# Patient Record
Sex: FEMALE | Race: WHITE | NOT HISPANIC OR LATINO | Employment: FULL TIME | URBAN - METROPOLITAN AREA
[De-identification: names, ages, dates, MRNs, and addresses within clinical notes are randomized per-mention and may not be internally consistent; named-entity substitution may affect disease eponyms.]

---

## 2024-03-06 NOTE — PROGRESS NOTES
Chief Complaint   Patient presents with    Gynecologic Exam       HPI:   23 y.o. . Presents for well woman exam. Contraception:  Condoms  Menses:   q 28 days, lasts 6 days, changes super tampon q 2-3 hrs on heaviest days.   Pain:  Moderate, not too bad  Last pap: none previous   Complaints: Pt has no complaints today.        History reviewed. No pertinent past medical history.   Past Surgical History:   Procedure Laterality Date    NO PAST SURGERIES        Family History   Problem Relation Age of Onset    Breast cancer Mother         30s, mother was genetic tested and not hereditary cancer    Ovarian cancer Neg Hx     Dysfunctional uterine bleeding Neg Hx     Uterine cancer Neg Hx     Colon cancer Neg Hx     Prostate cancer Neg Hx     Deep vein thrombosis Neg Hx     Pulmonary embolism Neg Hx      Allergies as of 2024    (No Known Allergies)        PCP: needs PCP    /74   Pulse 72   Wt 57.6 kg (127 lb)   LMP 2024 Comment: Normal flow, lasting about 6 days  Breastfeeding No     PHYSICAL EXAM: Chaperone present   General- NAD, alert and oriented, appropriate  Psych- Normal mood, good memory  Neck- No masses, no thyroid enlargement  Lymphatic- No palpable neck, axillary, or groin nodes  CV- Regular rhythm, no murmurs  Resp- CTA to bases, no wheezes  Abdomen- Soft, non distended, non tender, no masses  Breast left-  Bilaterally symmetrical, no masses, non tender, no nipple discharge  Breast right- Bilaterally symmetrical, no masses, non tender, no nipple discharge  External genitalia- Normal female, no lesions  Urethra/meatus- Normal, no masses, non tender, no prolapse  Bladder- Normal, no masses, non tender, no prolapse  Vagina- Normal, no atrophy, no lesions, no discharge, no prolapse  Cvx- Normal, no lesions, no discharge, No cervical motion tenderness  Uterus- Normal size, shape & consistency.  Non tender, mobile.  Adnexa- No mass, non tender  Anus/Rectum/Perineum- Not performed  Ext- No  edema, no cyanosis    Skin- No lesions, no rashes, no acanthosis nigricans       ASSESSMENT and PLAN:    Diagnoses and all orders for this visit:    1. Well woman exam (Primary)  -     IGP,rfx Aptima HPV All Pth      Preventative:   BREAST HEALTH- Monthly self breast exam importance and how to reviewed. MMG and/or MRI (prn) reviewed per society guidelines and her individual history. Screening Imaging: Not medically needed  CERVICAL CANCER Screening- Reviewed current ASCCP guidelines for screening w and wo cotest HPV, age specific.  Screen: Updated today  COLON CANCER Screening- Reviewed current medical society guidelines and options.  Screen:  Not medically needed  SEXUAL HEALTH: Declines STD screening,  Safe sex and condoms  BONE HEALTH- Reviewed current medical society guidelines and options for testing, calcium and vit D intake.  Weight bearing exercise.  DEXA: Not medically needed  VACCINATIONS Recommended: Gardisil/HPV vaccine (up to 46yo), to check vaccine records for previous hpv vaccination  Importance discussed, risk being unvaccinated reviewed.  Questions answered  Genetic testing: Does not qualify.  Smoking status- NON SMOKER  Follow up PCP/Specialist PMHx and Labs  TRACK MENSES, RTO if <q21d, >7d long, heavy or painful.    All BIRTH CONTROL options R/B/A/SE/E of each reviewed in detail.  SAFE SEX/condoms importance reviewed.    Consider Mammogram starting age 30, follow up sooner with any breast changes or concerns. Mother had breast cancer and hereditary cancer testing was negative    Follow Up:  Return in about 1 year (around 3/14/2025).        Olga Lidia Addison, APRN  03/14/2024

## 2024-03-14 ENCOUNTER — OFFICE VISIT (OUTPATIENT)
Dept: OBSTETRICS AND GYNECOLOGY | Facility: CLINIC | Age: 24
End: 2024-03-14
Payer: COMMERCIAL

## 2024-03-14 VITALS — SYSTOLIC BLOOD PRESSURE: 124 MMHG | DIASTOLIC BLOOD PRESSURE: 74 MMHG | WEIGHT: 127 LBS | HEART RATE: 72 BPM

## 2024-03-14 DIAGNOSIS — Z01.419 WELL WOMAN EXAM: Primary | ICD-10-CM

## 2024-03-14 PROCEDURE — G0123 SCREEN CERV/VAG THIN LAYER: HCPCS | Performed by: NURSE PRACTITIONER

## 2024-03-14 RX ORDER — CETIRIZINE HYDROCHLORIDE, PSEUDOEPHEDRINE HYDROCHLORIDE 5; 120 MG/1; MG/1
1 TABLET, FILM COATED, EXTENDED RELEASE ORAL AS NEEDED
COMMUNITY

## 2024-03-15 ENCOUNTER — PATIENT ROUNDING (BHMG ONLY) (OUTPATIENT)
Dept: OBSTETRICS AND GYNECOLOGY | Facility: CLINIC | Age: 24
End: 2024-03-15
Payer: COMMERCIAL

## 2024-03-15 NOTE — PROGRESS NOTES
A My-Chart message has been sent to the patient for [place] [facility]

## 2024-03-19 LAB
CONV .: NORMAL
CYTOLOGIST CVX/VAG CYTO: NORMAL
CYTOLOGY CVX/VAG DOC CYTO: NORMAL
CYTOLOGY CVX/VAG DOC THIN PREP: NORMAL
DX ICD CODE: NORMAL
Lab: NORMAL
OTHER STN SPEC: NORMAL
STAT OF ADQ CVX/VAG CYTO-IMP: NORMAL

## 2024-07-02 ENCOUNTER — TRANSCRIBE ORDERS (OUTPATIENT)
Dept: LAB | Facility: HOSPITAL | Age: 24
End: 2024-07-02
Payer: COMMERCIAL

## 2024-07-02 ENCOUNTER — LAB (OUTPATIENT)
Dept: LAB | Facility: HOSPITAL | Age: 24
End: 2024-07-02
Payer: COMMERCIAL

## 2024-07-02 DIAGNOSIS — L70.0 ACNE VULGARIS: Primary | ICD-10-CM

## 2024-07-02 DIAGNOSIS — L70.0 ACNE VULGARIS: ICD-10-CM

## 2024-07-02 LAB
ALBUMIN SERPL-MCNC: 4.5 G/DL (ref 3.5–5.2)
ALP SERPL-CCNC: 63 U/L (ref 39–117)
ALT SERPL W P-5'-P-CCNC: 9 U/L (ref 1–33)
AST SERPL-CCNC: 19 U/L (ref 1–32)
BILIRUB CONJ SERPL-MCNC: <0.2 MG/DL (ref 0–0.3)
BILIRUB INDIRECT SERPL-MCNC: NORMAL MG/DL
BILIRUB SERPL-MCNC: 0.3 MG/DL (ref 0–1.2)
CHOLEST SERPL-MCNC: 160 MG/DL (ref 0–200)
HDLC SERPL-MCNC: 71 MG/DL (ref 40–60)
LDLC SERPL CALC-MCNC: 73 MG/DL (ref 0–100)
LDLC/HDLC SERPL: 1.02 {RATIO}
PROT SERPL-MCNC: 7.4 G/DL (ref 6–8.5)
TRIGL SERPL-MCNC: 83 MG/DL (ref 0–150)
VLDLC SERPL-MCNC: 16 MG/DL (ref 5–40)

## 2024-07-02 PROCEDURE — 36415 COLL VENOUS BLD VENIPUNCTURE: CPT

## 2024-07-02 PROCEDURE — 80076 HEPATIC FUNCTION PANEL: CPT

## 2024-07-02 PROCEDURE — 80061 LIPID PANEL: CPT

## 2024-09-10 ENCOUNTER — TRANSCRIBE ORDERS (OUTPATIENT)
Dept: ADMINISTRATIVE | Facility: HOSPITAL | Age: 24
End: 2024-09-10
Payer: COMMERCIAL

## 2024-09-10 ENCOUNTER — LAB (OUTPATIENT)
Dept: LAB | Facility: HOSPITAL | Age: 24
End: 2024-09-10
Payer: COMMERCIAL

## 2024-09-10 ENCOUNTER — OFFICE VISIT (OUTPATIENT)
Dept: INTERNAL MEDICINE | Age: 24
End: 2024-09-10
Payer: COMMERCIAL

## 2024-09-10 VITALS
DIASTOLIC BLOOD PRESSURE: 62 MMHG | TEMPERATURE: 98.6 F | HEART RATE: 87 BPM | OXYGEN SATURATION: 98 % | HEIGHT: 67 IN | WEIGHT: 128 LBS | BODY MASS INDEX: 20.09 KG/M2 | RESPIRATION RATE: 18 BRPM | SYSTOLIC BLOOD PRESSURE: 118 MMHG

## 2024-09-10 DIAGNOSIS — L70.0 ACNE VULGARIS: ICD-10-CM

## 2024-09-10 DIAGNOSIS — J30.1 SEASONAL ALLERGIC RHINITIS DUE TO POLLEN: ICD-10-CM

## 2024-09-10 DIAGNOSIS — Z00.00 ROUTINE HISTORY AND PHYSICAL EXAMINATION OF ADULT: Primary | ICD-10-CM

## 2024-09-10 DIAGNOSIS — L70.0 ACNE VULGARIS: Primary | ICD-10-CM

## 2024-09-10 DIAGNOSIS — E55.9 VITAMIN D DEFICIENCY: ICD-10-CM

## 2024-09-10 DIAGNOSIS — Z00.00 LABORATORY EXAMINATION ORDERED AS PART OF A ROUTINE GENERAL MEDICAL EXAMINATION: ICD-10-CM

## 2024-09-10 DIAGNOSIS — Z11.59 NEED FOR HEPATITIS C SCREENING TEST: ICD-10-CM

## 2024-09-10 LAB
ALBUMIN SERPL-MCNC: 4.4 G/DL (ref 3.5–5.2)
ALP SERPL-CCNC: 69 U/L (ref 39–117)
ALT SERPL W P-5'-P-CCNC: 14 U/L (ref 1–33)
AST SERPL-CCNC: 19 U/L (ref 1–32)
BILIRUB CONJ SERPL-MCNC: <0.2 MG/DL (ref 0–0.3)
BILIRUB INDIRECT SERPL-MCNC: NORMAL MG/DL
BILIRUB SERPL-MCNC: 0.3 MG/DL (ref 0–1.2)
CHOLEST SERPL-MCNC: 180 MG/DL (ref 0–200)
HDLC SERPL-MCNC: 58 MG/DL (ref 40–60)
LDLC SERPL CALC-MCNC: 107 MG/DL (ref 0–100)
LDLC/HDLC SERPL: 1.82 {RATIO}
PROT SERPL-MCNC: 7.4 G/DL (ref 6–8.5)
TRIGL SERPL-MCNC: 83 MG/DL (ref 0–150)
VLDLC SERPL-MCNC: 15 MG/DL (ref 5–40)

## 2024-09-10 PROCEDURE — 80061 LIPID PANEL: CPT

## 2024-09-10 PROCEDURE — 80076 HEPATIC FUNCTION PANEL: CPT

## 2024-09-10 PROCEDURE — 36415 COLL VENOUS BLD VENIPUNCTURE: CPT

## 2024-09-10 PROCEDURE — 99385 PREV VISIT NEW AGE 18-39: CPT | Performed by: INTERNAL MEDICINE

## 2024-09-10 RX ORDER — ISOTRETINOIN 30 MG/1
30 CAPSULE ORAL 2 TIMES DAILY
COMMUNITY

## 2024-09-10 NOTE — ASSESSMENT & PLAN NOTE
Ages 19 to 39 Counseling/Anticipatory Guidance Discussed: nutrition, family planning/contraception, physical activity, healthy weight, injury prevention, misuse of tobacco, alcohol and drugs, sexual behavior and STDs, dental health, mental health, immunizations, breast cancer and self breast exams, and use of seatbelt    Up-to-date with dental exams goes to [facility]

## 2024-09-10 NOTE — PROGRESS NOTES
"CHIEF COMPLAINT  Dee Matthews presents to [facility] to Establish Care (24 year old female here today to establish care with a new pcp. )     HPI    24-year-old patient here to establish care    Records reviewed, meds reviewed in detail, labs reviewed with patient.  Plan of care is as follows below.  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   Medical History: has no past medical history on file.   Surgical History: has a past surgical history that includes No past surgeries.   Family History: family history includes Breast cancer in her mother.   Social History: reports that she has never smoked. She has never been exposed to tobacco smoke. She has never used smokeless tobacco. She reports current alcohol use. She reports that she does not use drugs.  Social History     Social History Narrative    Not on file         Current Outpatient Medications:     cetirizine-pseudoephedrine (ZyrTEC-D) 5-120 MG per 12 hr tablet, Take 1 tablet by mouth As Needed., Disp: , Rfl:     ISOtretinoin (ACCUTANE) 30 MG capsule, Take 1 capsule by mouth 2 (Two) Times a Day., Disp: , Rfl:      OBJECTIVE  Vital Signs  Vitals:    09/10/24 0802   BP: 118/62   BP Location: Left arm   Patient Position: Sitting   Pulse: 87   Resp: 18   Temp: 98.6 °F (37 °C)   TempSrc: Temporal   SpO2: 98%   Weight: 58.1 kg (128 lb)   Height: 170.2 cm (67\")      Body mass index is 20.05 kg/m².      Physical Exam  Vitals and nursing note reviewed.   Constitutional:       Appearance: Normal appearance. " "  HENT:      Head: Normocephalic and atraumatic.   Cardiovascular:      Rate and Rhythm: Normal rate and regular rhythm.   Pulmonary:      Effort: Pulmonary effort is normal.      Breath sounds: Normal breath sounds.   Abdominal:      Palpations: Abdomen is soft.   Musculoskeletal:      Cervical back: Normal range of motion and neck supple.   Neurological:      General: No focal deficit present.      Mental Status: She is alert and oriented to person, place, and time.         RESULTS REVIEW  No results found for: \"PROBNP\", \"BNP\"  CMP          7/2/2024    08:14   CMP   Total Protein 7.4    Albumin 4.5    Total Bilirubin 0.3    Alkaline Phosphatase 63    AST (SGOT) 19    ALT (SGPT) 9         Lipid Panel          7/2/2024    08:14   Lipid Panel   Total Cholesterol 160    Triglycerides 83    HDL Cholesterol 71    VLDL Cholesterol 16    LDL Cholesterol  73    LDL/HDL Ratio 1.02       No results found for: \"TSH\"   No results found for: \"FREET4\"         No Images in the past 120 days found..              ASSESSMENT & PLAN  Diagnoses and all orders for this visit:    1. Routine history and physical examination of adult (Primary)  Assessment & Plan:  Ages 19 to 39 Counseling/Anticipatory Guidance Discussed: nutrition, family planning/contraception, physical activity, healthy weight, injury prevention, misuse of tobacco, alcohol and drugs, sexual behavior and STDs, dental health, mental health, immunizations, breast cancer and self breast exams, and use of seatbelt    Up-to-date with dental exams goes to [facility]    Orders:  -     CBC w AUTO Differential; Future  -     TSH+Free T4; Future  -     Vitamin D 25 hydroxy; Future  -     Vitamin B12; Future  -     Hepatitis C antibody; Future  -     Basic Metabolic Panel; Future    2. Acne vulgaris  Comments:  Of face-on Accutane for past 3 months followed by Dr. Perez gets liver function test and lipids checked monthly reviewed and normal    3. Laboratory examination " ordered as part of a routine general medical examination  -     CBC w AUTO Differential; Future  -     TSH+Free T4; Future  -     Vitamin D 25 hydroxy; Future  -     Vitamin B12; Future  -     Hepatitis C antibody; Future  -     Basic Metabolic Panel; Future    4. Need for hepatitis C screening test  -     CBC w AUTO Differential; Future  -     TSH+Free T4; Future  -     Vitamin D 25 hydroxy; Future  -     Vitamin B12; Future  -     Hepatitis C antibody; Future  -     Basic Metabolic Panel; Future    5. Seasonal allergic rhinitis due to pollen  -     CBC w AUTO Differential; Future  -     TSH+Free T4; Future  -     Vitamin D 25 hydroxy; Future  -     Vitamin B12; Future  -     Hepatitis C antibody; Future  -     Basic Metabolic Panel; Future    6. Vitamin D deficiency  -     CBC w AUTO Differential; Future  -     TSH+Free T4; Future  -     Vitamin D 25 hydroxy; Future  -     Vitamin B12; Future  -     Hepatitis C antibody; Future  -     Basic Metabolic Panel; Future         BMI is within normal parameters. No other follow-up for BMI required.     Plan-9/10/2024  Patient Instructions   Recommend flu and covid vaccines in next few weeks, HPV vaccine   Do labs today  Yearly Physical     FOLLOW UP  Return in about 1 year (around 9/11/2025), or if symptoms worsen or fail to improve, for Annual physical.    Patient was given instructions and counseling regarding her condition or for health maintenance advice. Please see specific information pulled into the AVS if appropriate.

## 2024-09-13 ENCOUNTER — LAB (OUTPATIENT)
Dept: LAB | Facility: HOSPITAL | Age: 24
End: 2024-09-13
Payer: COMMERCIAL

## 2024-09-13 DIAGNOSIS — Z00.00 ROUTINE HISTORY AND PHYSICAL EXAMINATION OF ADULT: ICD-10-CM

## 2024-09-13 DIAGNOSIS — Z11.59 NEED FOR HEPATITIS C SCREENING TEST: ICD-10-CM

## 2024-09-13 DIAGNOSIS — E55.9 VITAMIN D DEFICIENCY: ICD-10-CM

## 2024-09-13 DIAGNOSIS — Z00.00 LABORATORY EXAMINATION ORDERED AS PART OF A ROUTINE GENERAL MEDICAL EXAMINATION: ICD-10-CM

## 2024-09-13 DIAGNOSIS — J30.1 SEASONAL ALLERGIC RHINITIS DUE TO POLLEN: ICD-10-CM

## 2024-09-13 LAB
25(OH)D3 SERPL-MCNC: 54.4 NG/ML (ref 30–100)
ANION GAP SERPL CALCULATED.3IONS-SCNC: 11 MMOL/L (ref 5–15)
BASOPHILS # BLD AUTO: 0.01 10*3/MM3 (ref 0–0.2)
BASOPHILS NFR BLD AUTO: 0.2 % (ref 0–1.5)
BUN SERPL-MCNC: 11 MG/DL (ref 6–20)
BUN/CREAT SERPL: 12.5 (ref 7–25)
CALCIUM SPEC-SCNC: 9.9 MG/DL (ref 8.6–10.5)
CHLORIDE SERPL-SCNC: 103 MMOL/L (ref 98–107)
CO2 SERPL-SCNC: 25 MMOL/L (ref 22–29)
CREAT SERPL-MCNC: 0.88 MG/DL (ref 0.57–1)
DEPRECATED RDW RBC AUTO: 40 FL (ref 37–54)
EGFRCR SERPLBLD CKD-EPI 2021: 93.7 ML/MIN/1.73
EOSINOPHIL # BLD AUTO: 0.06 10*3/MM3 (ref 0–0.4)
EOSINOPHIL NFR BLD AUTO: 1 % (ref 0.3–6.2)
ERYTHROCYTE [DISTWIDTH] IN BLOOD BY AUTOMATED COUNT: 12.5 % (ref 12.3–15.4)
GLUCOSE SERPL-MCNC: 91 MG/DL (ref 65–99)
HCT VFR BLD AUTO: 42.7 % (ref 34–46.6)
HCV AB SER QL: NORMAL
HGB BLD-MCNC: 14.6 G/DL (ref 12–15.9)
IMM GRANULOCYTES # BLD AUTO: 0.01 10*3/MM3 (ref 0–0.05)
IMM GRANULOCYTES NFR BLD AUTO: 0.2 % (ref 0–0.5)
LYMPHOCYTES # BLD AUTO: 1.27 10*3/MM3 (ref 0.7–3.1)
LYMPHOCYTES NFR BLD AUTO: 21.2 % (ref 19.6–45.3)
MCH RBC QN AUTO: 30 PG (ref 26.6–33)
MCHC RBC AUTO-ENTMCNC: 34.2 G/DL (ref 31.5–35.7)
MCV RBC AUTO: 87.9 FL (ref 79–97)
MONOCYTES # BLD AUTO: 0.55 10*3/MM3 (ref 0.1–0.9)
MONOCYTES NFR BLD AUTO: 9.2 % (ref 5–12)
NEUTROPHILS NFR BLD AUTO: 4.1 10*3/MM3 (ref 1.7–7)
NEUTROPHILS NFR BLD AUTO: 68.2 % (ref 42.7–76)
NRBC BLD AUTO-RTO: 0 /100 WBC (ref 0–0.2)
PLATELET # BLD AUTO: 212 10*3/MM3 (ref 140–450)
PMV BLD AUTO: 10.4 FL (ref 6–12)
POTASSIUM SERPL-SCNC: 4.2 MMOL/L (ref 3.5–5.2)
RBC # BLD AUTO: 4.86 10*6/MM3 (ref 3.77–5.28)
SODIUM SERPL-SCNC: 139 MMOL/L (ref 136–145)
T4 FREE SERPL-MCNC: 1.26 NG/DL (ref 0.92–1.68)
TSH SERPL DL<=0.05 MIU/L-ACNC: 3.44 UIU/ML (ref 0.27–4.2)
VIT B12 BLD-MCNC: 524 PG/ML (ref 211–946)
WBC NRBC COR # BLD AUTO: 6 10*3/MM3 (ref 3.4–10.8)

## 2024-09-13 PROCEDURE — 84439 ASSAY OF FREE THYROXINE: CPT

## 2024-09-13 PROCEDURE — 82306 VITAMIN D 25 HYDROXY: CPT

## 2024-09-13 PROCEDURE — 36415 COLL VENOUS BLD VENIPUNCTURE: CPT

## 2024-09-13 PROCEDURE — 85025 COMPLETE CBC W/AUTO DIFF WBC: CPT

## 2024-09-13 PROCEDURE — 80048 BASIC METABOLIC PNL TOTAL CA: CPT

## 2024-09-13 PROCEDURE — 86803 HEPATITIS C AB TEST: CPT

## 2024-09-13 PROCEDURE — 82607 VITAMIN B-12: CPT

## 2024-09-13 PROCEDURE — 84443 ASSAY THYROID STIM HORMONE: CPT
